# Patient Record
Sex: FEMALE | Employment: UNEMPLOYED | URBAN - METROPOLITAN AREA
[De-identification: names, ages, dates, MRNs, and addresses within clinical notes are randomized per-mention and may not be internally consistent; named-entity substitution may affect disease eponyms.]

---

## 2024-02-20 ENCOUNTER — OFFICE VISIT (OUTPATIENT)
Dept: PRIMARY CARE | Facility: CLINIC | Age: 49
End: 2024-02-20
Payer: COMMERCIAL

## 2024-02-20 VITALS — WEIGHT: 212 LBS | DIASTOLIC BLOOD PRESSURE: 80 MMHG | SYSTOLIC BLOOD PRESSURE: 140 MMHG

## 2024-02-20 DIAGNOSIS — R63.5 WEIGHT GAIN: ICD-10-CM

## 2024-02-20 DIAGNOSIS — R23.2 FLUSHING: ICD-10-CM

## 2024-02-20 DIAGNOSIS — R03.0 BLOOD PRESSURE ELEVATED WITHOUT HISTORY OF HTN: ICD-10-CM

## 2024-02-20 DIAGNOSIS — F41.8 DEPRESSION WITH ANXIETY: ICD-10-CM

## 2024-02-20 DIAGNOSIS — Z72.0 TOBACCO USE: ICD-10-CM

## 2024-02-20 DIAGNOSIS — Z00.00 HEALTH CARE MAINTENANCE: Primary | ICD-10-CM

## 2024-02-20 PROCEDURE — 99204 OFFICE O/P NEW MOD 45 MIN: CPT | Performed by: INTERNAL MEDICINE

## 2024-02-20 NOTE — PROGRESS NOTES
Subjective   Patient ID: Idania Simon is a 48 y.o. female who presents for No chief complaint on file..    HPI the patient for first time see updated front sheet no chest pain no shortness of breath was found increasing motion during the past 2 years menstrual cycles becoming irregular she had lost her longtime partner just before COVID was isolated and still taking care of adult children were doing well but also have some element of depression and anxiety over her life changes she has not preferred to talk to anybody about this or take medication for general health has been good she was drinking heavily and then stop drinking one half bottle of vodka per day and now only drinks rarely and socially still smokes on a regular basis just less than 1 pack/day takes no medications    Past medical history unremarkable per her    Medications none at the current time    Allergies no known drug allergies    Social history tobacco 1 pack/day alcohol social    Family history daughter with depression    Prevention hikes for exercise walks a dog no recent blood work has not had follow-up with GYN    Review of Systems    Objective   There were no vitals taken for this visit.    Physical Exam vital signs noted alert and oriented x 3 NCAT no lid lag no thyromegaly no JVD or bruit chest clear to auscultations regular rate and rhythm S1-S2 abdomen soft nontender normal active bowel sounds LS spine normal curvature negative straight leg raise extremities no clubbing cyanosis or edema normal distal pulses DTR 2+ and brisk    Assessment/Plan    impression hormonal changes with menstrual cycle changes tobacco weight gain used to weigh 185 pounds slower metabolism elevated blood pressure other diagnoses depression with anxiety  Plan stop smoking altogether stop drinking altogether increase water consumption diet weight loss exercise overall consider the psychology of talking with somebody or medication she will consider follow-up on  blood work comprehensive panel advised on glucose potassium kidney and liver function check CBC advise on blood count check lipid panel advise on thyroid and metabolism check TSH FSH and LH advised on symptoms and flushing which may be normal follow-up with new GYN referral to be made (check) and recheck based on above  [id number] [id number]

## 2024-02-27 ENCOUNTER — LAB (OUTPATIENT)
Dept: LAB | Facility: LAB | Age: 49
End: 2024-02-27
Payer: COMMERCIAL

## 2024-02-27 DIAGNOSIS — F41.8 DEPRESSION WITH ANXIETY: ICD-10-CM

## 2024-02-27 DIAGNOSIS — R63.5 WEIGHT GAIN: ICD-10-CM

## 2024-02-27 DIAGNOSIS — Z72.0 TOBACCO USE: ICD-10-CM

## 2024-02-27 DIAGNOSIS — Z00.00 HEALTH CARE MAINTENANCE: ICD-10-CM

## 2024-02-27 DIAGNOSIS — R23.2 FLUSHING: ICD-10-CM

## 2024-02-27 DIAGNOSIS — R03.0 BLOOD PRESSURE ELEVATED WITHOUT HISTORY OF HTN: ICD-10-CM

## 2024-02-27 LAB
ALBUMIN SERPL BCP-MCNC: 4.1 G/DL (ref 3.4–5)
ALP SERPL-CCNC: 47 U/L (ref 33–110)
ALT SERPL W P-5'-P-CCNC: 10 U/L (ref 7–45)
ANION GAP SERPL CALC-SCNC: 13 MMOL/L (ref 10–20)
AST SERPL W P-5'-P-CCNC: 11 U/L (ref 9–39)
BILIRUB SERPL-MCNC: 0.5 MG/DL (ref 0–1.2)
BUN SERPL-MCNC: 11 MG/DL (ref 6–23)
CALCIUM SERPL-MCNC: 9.1 MG/DL (ref 8.6–10.6)
CHLORIDE SERPL-SCNC: 105 MMOL/L (ref 98–107)
CHOLEST SERPL-MCNC: 190 MG/DL (ref 0–199)
CHOLESTEROL/HDL RATIO: 3.7
CO2 SERPL-SCNC: 25 MMOL/L (ref 21–32)
CREAT SERPL-MCNC: 0.77 MG/DL (ref 0.5–1.05)
EGFRCR SERPLBLD CKD-EPI 2021: >90 ML/MIN/1.73M*2
ERYTHROCYTE [DISTWIDTH] IN BLOOD BY AUTOMATED COUNT: 14.6 % (ref 11.5–14.5)
FSH SERPL-ACNC: 9.5 IU/L
GLUCOSE SERPL-MCNC: 101 MG/DL (ref 74–99)
HCT VFR BLD AUTO: 42.2 % (ref 36–46)
HDLC SERPL-MCNC: 50.7 MG/DL
HGB BLD-MCNC: 13.8 G/DL (ref 12–16)
LDLC SERPL CALC-MCNC: 116 MG/DL
LH SERPL-ACNC: 1.5 IU/L
MCH RBC QN AUTO: 31.5 PG (ref 26–34)
MCHC RBC AUTO-ENTMCNC: 32.7 G/DL (ref 32–36)
MCV RBC AUTO: 96 FL (ref 80–100)
NON HDL CHOLESTEROL: 139 MG/DL (ref 0–149)
NRBC BLD-RTO: 0 /100 WBCS (ref 0–0)
PLATELET # BLD AUTO: 302 X10*3/UL (ref 150–450)
POTASSIUM SERPL-SCNC: 4.2 MMOL/L (ref 3.5–5.3)
PROT SERPL-MCNC: 6.8 G/DL (ref 6.4–8.2)
RBC # BLD AUTO: 4.38 X10*6/UL (ref 4–5.2)
SODIUM SERPL-SCNC: 139 MMOL/L (ref 136–145)
TRIGL SERPL-MCNC: 119 MG/DL (ref 0–149)
TSH SERPL-ACNC: 1.24 MIU/L (ref 0.44–3.98)
VLDL: 24 MG/DL (ref 0–40)
WBC # BLD AUTO: 8.4 X10*3/UL (ref 4.4–11.3)

## 2024-02-27 PROCEDURE — 85027 COMPLETE CBC AUTOMATED: CPT

## 2024-02-27 PROCEDURE — 80053 COMPREHEN METABOLIC PANEL: CPT

## 2024-02-27 PROCEDURE — 36415 COLL VENOUS BLD VENIPUNCTURE: CPT

## 2024-02-27 PROCEDURE — 83002 ASSAY OF GONADOTROPIN (LH): CPT

## 2024-02-27 PROCEDURE — 83001 ASSAY OF GONADOTROPIN (FSH): CPT

## 2024-02-27 PROCEDURE — 84443 ASSAY THYROID STIM HORMONE: CPT

## 2024-02-27 PROCEDURE — 80061 LIPID PANEL: CPT

## 2024-04-02 ENCOUNTER — OFFICE VISIT (OUTPATIENT)
Dept: PRIMARY CARE | Facility: CLINIC | Age: 49
End: 2024-04-02
Payer: COMMERCIAL

## 2024-04-02 VITALS — DIASTOLIC BLOOD PRESSURE: 77 MMHG | SYSTOLIC BLOOD PRESSURE: 137 MMHG

## 2024-04-02 DIAGNOSIS — K11.8 MASS OF LEFT PAROTID GLAND: Primary | ICD-10-CM

## 2024-04-02 DIAGNOSIS — E78.5 BORDERLINE HYPERLIPIDEMIA: ICD-10-CM

## 2024-04-02 PROCEDURE — 99213 OFFICE O/P EST LOW 20 MIN: CPT | Performed by: INTERNAL MEDICINE

## 2024-04-02 NOTE — PROGRESS NOTES
Subjective   Patient ID: Idania Simon is a 48 y.o. female who presents for No chief complaint on file..    HPI sick visit same day after hours no staff no chest pain no shortness of breath no side effect with medication had mononucleosis earlier in life had some persistent left posterior chain lymph node swelling but approximately several weeks ago developed some swelling by the left side of her jaw she thought it was the same thing although it appears to be in a different location than what she was saying she has had some tingling across the face on that side and some otic discomfort    Social history tobacco 1 pack/day alcohol less    Review of Systems    Objective   There were no vitals taken for this visit.    Physical Exam vital signs noted alert and oriented x 3 NCAT cranial nerves II through XII intact no visible changes to the skin however there appears to be some left rami discomfort and swelling and firmness there tenderness to the pinna TM EAC clear bilaterally no AC nodes or PC nodes OP benign nares without discharge no coryza no JVD or bruit chest clear to auscultation CV regular rate and rhythm S1-S2 without murmur gallop or rub extremities no clubbing cyanosis or edema normal distal pulses    Assessment/Plan impression parotid enlargement tobacco other diagnoses/borderline lipid  Plan stop smoking altogether stop drinking altogether d/w prior lipids and benefit cardiovascular may need follow-up with ENT and in addition may use blood work and imaging to help identify nature of the swelling she does not feel her mouth is overly dry good diet regular exercise increase water consumption throat lozenges recheck based on above  Addendum  see prior blood work (esr?, none other prior) and imaging (none prior)  RAC

## 2024-04-16 ENCOUNTER — TELEPHONE (OUTPATIENT)
Dept: PRIMARY CARE | Facility: CLINIC | Age: 49
End: 2024-04-16

## 2024-04-17 DIAGNOSIS — K11.8 MASS OF LEFT PAROTID GLAND: Primary | ICD-10-CM

## 2024-04-17 DIAGNOSIS — E04.1 THYROID CYST: ICD-10-CM

## 2024-04-19 ENCOUNTER — HOSPITAL ENCOUNTER (OUTPATIENT)
Dept: RADIOLOGY | Facility: CLINIC | Age: 49
End: 2024-04-19
Payer: COMMERCIAL

## 2024-04-19 ENCOUNTER — HOSPITAL ENCOUNTER (OUTPATIENT)
Dept: RADIOLOGY | Facility: CLINIC | Age: 49
Discharge: HOME | End: 2024-04-19
Payer: COMMERCIAL

## 2024-04-19 DIAGNOSIS — K11.8 MASS OF LEFT PAROTID GLAND: ICD-10-CM

## 2024-04-19 PROCEDURE — 2550000001 HC RX 255 CONTRASTS: Performed by: INTERNAL MEDICINE

## 2024-04-19 PROCEDURE — 70491 CT SOFT TISSUE NECK W/DYE: CPT

## 2024-04-19 PROCEDURE — 70491 CT SOFT TISSUE NECK W/DYE: CPT | Performed by: RADIOLOGY

## 2024-04-19 RX ADMIN — IOHEXOL 67 ML: 350 INJECTION, SOLUTION INTRAVENOUS at 13:03

## 2024-06-06 ENCOUNTER — OFFICE VISIT (OUTPATIENT)
Dept: OTOLARYNGOLOGY | Facility: CLINIC | Age: 49
End: 2024-06-06
Payer: COMMERCIAL

## 2024-06-06 VITALS — WEIGHT: 187 LBS

## 2024-06-06 DIAGNOSIS — E04.1 THYROID NODULE: ICD-10-CM

## 2024-06-06 DIAGNOSIS — E04.1 THYROID CYST: ICD-10-CM

## 2024-06-06 DIAGNOSIS — K11.8 MASS OF LEFT PAROTID GLAND: ICD-10-CM

## 2024-06-06 PROCEDURE — 99204 OFFICE O/P NEW MOD 45 MIN: CPT | Performed by: GENERAL PRACTICE

## 2024-06-06 ASSESSMENT — PATIENT HEALTH QUESTIONNAIRE - PHQ9
SUM OF ALL RESPONSES TO PHQ9 QUESTIONS 1 AND 2: 0
2. FEELING DOWN, DEPRESSED OR HOPELESS: NOT AT ALL
1. LITTLE INTEREST OR PLEASURE IN DOING THINGS: NOT AT ALL

## 2024-06-08 ENCOUNTER — HOSPITAL ENCOUNTER (OUTPATIENT)
Dept: RADIOLOGY | Facility: HOSPITAL | Age: 49
Discharge: HOME | End: 2024-06-08
Payer: COMMERCIAL

## 2024-06-08 DIAGNOSIS — E04.1 THYROID NODULE: ICD-10-CM

## 2024-06-08 PROCEDURE — 76536 US EXAM OF HEAD AND NECK: CPT

## 2024-06-08 PROCEDURE — 76536 US EXAM OF HEAD AND NECK: CPT | Performed by: RADIOLOGY

## 2024-06-10 NOTE — PROGRESS NOTES
Otolaryngology - Head and Neck Surgery Outpatient New Patient Visit Note        Assessment/Plan   Problem List Items Addressed This Visit    None  Visit Diagnoses         Codes    Mass of left parotid gland     K11.8    Relevant Orders    Referral to ENT    BI US fine needle aspiration    Thyroid cyst     E04.1    Thyroid nodule     E04.1    Relevant Orders    Referral to ENT    US thyroid            48yoF with a left parotid mass which pt reports has been present since approx 2012 with episode of mono.  Some recent increase in size.  CT neck confirms 2.7cm heterogenous enhancing left parotid mass.   Will acquire FNA to aid in eval.  Suspect pleo or warthins (given smoking history).   Will refer to head and neck ENT for removal.     Also with R thyroid nodule, will acquire US to better define.        Follow up:  -plan for follow up in clinic as needed and after completion of ordered studies    All of the above findings, impressions, treatment planning and follow up plans were discussed with the patient who indicated understanding.  the patient was instructed to contact or return to clinic sooner if symptoms/signs persist or worsen despite the above management.      Roni Lynch MD  Otolaryngology - Head and Neck Surgery            History Of Present Illness  Idania Simon is a 48 y.o. female presenting for evluation of a left parotid mass.     Pt reports lonstanding history of L>R parotid tail region fullness.   Noted after mono infection in 2012.  R side largely resolved but noted some persistent left sided fullness.    Noted increase in size in recent 6 months, without inciting illness/trauma.   Notes some mild local discomfort, but no severe pain.  Somewhat firm and TTM.   No overlying skin changes  No symptoms of sialolithiasis    Has noted some left ear fullness and possible hearing loss.                  Past Medical History  She has no past medical history on file.    Surgical History  She has no past  surgical history on file.     Social History  She reports that she has been smoking cigarettes. She has never used smokeless tobacco. No history on file for alcohol use and drug use.    Family History  No family history on file.     Allergies  Patient has no known allergies.    Review of Systems  ROS: Pertinent positives as noted in HPI.    - CONSTITUTIONAL: Does not report weight loss, fever or chills.    - HEENT:   Ear: Does not report tinnitus, vertigo, hearing loss, otalgia, otorrhea  Nose: Does not report congestion, rhinorrhea, epistaxis, decreased smell  Throat: Does not report pain, dysphagia, odynophagia  Larynx: Does not report hoarseness,  difficulty breathing, pain with speaking (odynophonia)  Neck:    Face: Does not report sinus pain, pressure, swelling, numbness, weakness     - RESPIRATORY: Does not report SOB or cough.    - CV: Does not report palpitations or chest pain.     - GI: Does not report abdominal pain, nausea, vomiting or diarrhea.    - : Does not report dysuria or urinary frequency.    - MSK: Does not report myalgia or joint pain.    - SKIN: Does not report rash or pruritus.    - NEUROLOGICAL: Does not report headache or syncope.    - PSYCHIATRIC: Does not report recent changes in mood. Does not report anxiety or depression.         Physical Exam:     GENERAL:   Alert & Oriented to person, place and time; Normal affect and appearance. Well developed and well nourished. Conversant & cooperative with examination.     HEAD:   Normocephalic, atraumatic. No sinus tenderness to palpation. Normal parotid bilaterally. Normal facial strength.     NEUROLOGIC:   Cranial nerves II-XII grossly intact, gait WNL. Normal mood and affect.    EYES:   Extraocular movements intact. Pupils equal, round, reactive to light and accommodation. No nystagmus, no ptosis. no scleral injection.    EAR:   Normal auricle. No discomfort or TTP with manipulation.   Handheld otoscopic exam showed normal external auditory  canals bilaterally. No purulence or EAC inflammation. Minimal cerumen.   Right tympanic membrane clear and mobile without evidence of perforation, retraction or middle ear effusion.   Left tympanic membrane clear and mobile without evidence of perforation, retraction or middle ear effusion.     NOSE:   No external deformity. No external nasal lesions, lacerations, or scars. Nasal tip symmetrical with normal nasal valves.   Nasal cavity with essentially midline septum, normal mucosa and turbinates. No lesions, masses, purulence or polyps.     OC/OP:   Mucous membranes moist, no masses, lesions or exudates.   Normal tongue, floor of mouth, teeth, gums, lips. Normal posterior pharyngeal wall.    Normal tonsils without erythema, exudate or obvious calculi     NECK:  left tail of parotid with >2cm fullness which is somewhat fixed, minimally TTP, nonfluctuant.    Otherwise no neck masses or thyroid enlargement. Trachea midline. No tenderness to palpation    LYMPHATIC:   No cervical lymphadenopathy.     RESPIRATORY:   Symmetric chest elevation & no retractions. No significant hoarseness. No increased work of breathing.    CV:   No clubbing or cyanosis. No obvious edema    Skin:   No facial rashes, vesicles or lesions.     Extremities:   No gross abnormalities      Clinic Procedure        Information review:  External sources (notes, imaging, lab results) listed below personally reviewed to aid in medical decision making process.  - CT neck 4/19/24  -PCM note Gilbert 4/2/24  - PCM note Gilbert 2/20/24

## 2024-07-03 ENCOUNTER — HOSPITAL ENCOUNTER (OUTPATIENT)
Dept: RADIOLOGY | Facility: HOSPITAL | Age: 49
Discharge: HOME | End: 2024-07-03
Payer: COMMERCIAL

## 2024-07-03 VITALS
RESPIRATION RATE: 20 BRPM | OXYGEN SATURATION: 100 % | DIASTOLIC BLOOD PRESSURE: 92 MMHG | SYSTOLIC BLOOD PRESSURE: 161 MMHG | HEART RATE: 68 BPM

## 2024-07-03 DIAGNOSIS — K11.8 MASS OF LEFT PAROTID GLAND: ICD-10-CM

## 2024-07-03 PROCEDURE — 7100000010 HC PHASE TWO TIME - EACH INCREMENTAL 1 MINUTE

## 2024-07-03 PROCEDURE — 7100000009 HC PHASE TWO TIME - INITIAL BASE CHARGE

## 2024-07-03 PROCEDURE — 10005 FNA BX W/US GDN 1ST LES: CPT

## 2024-07-03 PROCEDURE — 2500000005 HC RX 250 GENERAL PHARMACY W/O HCPCS: Performed by: STUDENT IN AN ORGANIZED HEALTH CARE EDUCATION/TRAINING PROGRAM

## 2024-07-03 RX ORDER — LIDOCAINE HYDROCHLORIDE 10 MG/ML
INJECTION, SOLUTION EPIDURAL; INFILTRATION; INTRACAUDAL; PERINEURAL
Status: COMPLETED | OUTPATIENT
Start: 2024-07-03 | End: 2024-07-03

## 2024-07-03 ASSESSMENT — PAIN SCALES - GENERAL: PAINLEVEL_OUTOF10: 0 - NO PAIN

## 2024-07-03 NOTE — POST-PROCEDURE NOTE
Interventional Radiology Brief Postprocedure Note    Attending: aSi Hartman MD      Assistant: none    Diagnosis: left parotid gland mass    Description of procedure: US guided FNA     Anesthesia:  Local    Complications: None    Estimated Blood Loss: none    Medications (Filter: Administrations occurring from 0825 to 0845 on 07/03/24) As of 07/03/24 0845      lidocaine PF (Xylocaine) 10 mg/mL (1 %) injection (mL) Total volume:  5 mL      Date/Time Rate/Dose/Volume Action       07/03/24  0838 5 mL Given                   No specimens collected      See detailed result report with images in PACS.    The patient tolerated the procedure well without incident or complication and is in stable condition.

## 2024-07-08 LAB
LABORATORY COMMENT REPORT: NORMAL
LABORATORY COMMENT REPORT: NORMAL
PATH REPORT.FINAL DX SPEC: NORMAL
PATH REPORT.GROSS SPEC: NORMAL
PATH REPORT.RELEVANT HX SPEC: NORMAL
PATH REPORT.TOTAL CANCER: NORMAL

## 2024-07-09 NOTE — RESULT ENCOUNTER NOTE
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  Continue follow up with Head and Neck to discuss potential removal.

## 2024-07-12 ENCOUNTER — OFFICE VISIT (OUTPATIENT)
Dept: OTOLARYNGOLOGY | Facility: HOSPITAL | Age: 49
End: 2024-07-12
Payer: COMMERCIAL

## 2024-07-12 VITALS — TEMPERATURE: 98.2 F | BODY MASS INDEX: 29.49 KG/M2 | HEIGHT: 65 IN | WEIGHT: 177 LBS

## 2024-07-12 DIAGNOSIS — K11.8 MASS OF LEFT PAROTID GLAND: ICD-10-CM

## 2024-07-12 DIAGNOSIS — E04.1 THYROID NODULE: ICD-10-CM

## 2024-07-12 PROCEDURE — 99204 OFFICE O/P NEW MOD 45 MIN: CPT | Performed by: OTOLARYNGOLOGY

## 2024-07-12 PROCEDURE — 99214 OFFICE O/P EST MOD 30 MIN: CPT | Performed by: OTOLARYNGOLOGY

## 2024-07-12 PROCEDURE — 4004F PT TOBACCO SCREEN RCVD TLK: CPT | Performed by: OTOLARYNGOLOGY

## 2024-07-12 ASSESSMENT — PATIENT HEALTH QUESTIONNAIRE - PHQ9
1. LITTLE INTEREST OR PLEASURE IN DOING THINGS: NOT AT ALL
2. FEELING DOWN, DEPRESSED OR HOPELESS: NOT AT ALL
SUM OF ALL RESPONSES TO PHQ9 QUESTIONS 1 & 2: 0

## 2024-07-12 NOTE — PROGRESS NOTES
"ENT New Patient Visit    Chief complaint: left Warthin's tumor     History Of Present Illness  Idania Simon is a 48 y.o. female presents with left parotid mass present since 2012 but has progressively increased in size over the last 6 months. She was seen by Dr. Lynch who obtained a CT that demonstrated the 2.7cm left parotid mass, and subsequently biopsied, which showed Warthin's. She has a 30 pack year smoking history.     She has been experiencing intermittent pain and would like the mass removed.      Past Medical History  She has no past medical history on file.    Surgical History  She has no past surgical history on file.     Social History  She reports that she has been smoking cigarettes. She has never used smokeless tobacco. No history on file for alcohol use and drug use.    Family History  No family history on file.     Allergies  Patient has no known allergies.     Physical Exam:  CONSTITUTIONAL:  No acute distress  VOICE:  No hoarseness or other abnormality  RESPIRATION:  Breathing comfortably, no stridor  CV:  No clubbing/cyanosis/edema in hands  EYES:  sclera normal  NEURO:  Alert and oriented times 3, CN VII intact and symmetric bilaterally  HEAD AND FACE:  Symmetric facial features  SALIVARY GLANDS:  approximately 3 cm firm mass present in tail of left parotid, relatively mobile, somewhat tender to palpation  EARS:  Normal external ears, external auditory canals, and TMs to otoscopy, normal hearing to whispered voice.  NOSE:  External nose midline  ORAL CAVITY/OROPHARYNX/LIPS:  Normal mucous membranes, normal floor of mouth/tongue/OP, no masses or lesions  PHARYNGEAL WALLS:  No masses or lesions  NECK/LYMPH:  No LAD, trachea midline  SKIN:  Neck skin is without scar or injury  PSYCH:  Alert and oriented with appropriate mood and affect    Last Recorded Vitals  Temperature 36.8 °C (98.2 °F), height 1.651 m (5' 5\"), weight 80.3 kg (177 lb).    Relevant Results      No results found for this or " any previous visit (from the past 24 hour(s)).  CT Soft tissue neck 4/19/24:  IMPRESSION:  2.7 cm diameter heterogeneous enhancing mass predominantly located  within the superficial aspect of the left parotid gland with mild  extension to the deep lobe. Findings likely represent a primary  salivary gland neoplasm such as a Warthin's or benign mixed tumor.  Recommend FNA for further evaluation.      No enlarged or overtly suspicious cervical chain lymph nodes.      Subcentimeter right thyroid gland nodule.          Assessment and Plan  48 y.o. female with left parotid mass since 2012 that increased in size over the last 6 months, biopsy showed Warthin's tumor. She would like this removed. We discussed indications, risks  and benefits of left parotidectomy. We will schedule her at [place].     Bobbi Moody MD

## 2024-07-22 ENCOUNTER — PRE-ADMISSION TESTING (OUTPATIENT)
Dept: PREADMISSION TESTING | Facility: HOSPITAL | Age: 49
End: 2024-07-22
Payer: COMMERCIAL

## 2024-07-22 VITALS
WEIGHT: 178.57 LBS | OXYGEN SATURATION: 100 % | SYSTOLIC BLOOD PRESSURE: 143 MMHG | HEIGHT: 65 IN | BODY MASS INDEX: 29.75 KG/M2 | RESPIRATION RATE: 18 BRPM | DIASTOLIC BLOOD PRESSURE: 93 MMHG | TEMPERATURE: 95.9 F | HEART RATE: 72 BPM

## 2024-07-22 DIAGNOSIS — Z01.818 PREOP TESTING: Primary | ICD-10-CM

## 2024-07-22 LAB
ANION GAP SERPL CALC-SCNC: 11 MMOL/L (ref 10–20)
BUN SERPL-MCNC: 14 MG/DL (ref 6–23)
CALCIUM SERPL-MCNC: 8.8 MG/DL (ref 8.6–10.3)
CHLORIDE SERPL-SCNC: 105 MMOL/L (ref 98–107)
CO2 SERPL-SCNC: 26 MMOL/L (ref 21–32)
CREAT SERPL-MCNC: 0.76 MG/DL (ref 0.5–1.05)
EGFRCR SERPLBLD CKD-EPI 2021: >90 ML/MIN/1.73M*2
ERYTHROCYTE [DISTWIDTH] IN BLOOD BY AUTOMATED COUNT: 13.5 % (ref 11.5–14.5)
GLUCOSE SERPL-MCNC: 102 MG/DL (ref 74–99)
HCT VFR BLD AUTO: 40.2 % (ref 36–46)
HGB BLD-MCNC: 13.5 G/DL (ref 12–16)
MCH RBC QN AUTO: 32.2 PG (ref 26–34)
MCHC RBC AUTO-ENTMCNC: 33.6 G/DL (ref 32–36)
MCV RBC AUTO: 96 FL (ref 80–100)
NRBC BLD-RTO: 0 /100 WBCS (ref 0–0)
PLATELET # BLD AUTO: 240 X10*3/UL (ref 150–450)
POTASSIUM SERPL-SCNC: 4 MMOL/L (ref 3.5–5.3)
RBC # BLD AUTO: 4.19 X10*6/UL (ref 4–5.2)
SODIUM SERPL-SCNC: 138 MMOL/L (ref 136–145)
WBC # BLD AUTO: 7.3 X10*3/UL (ref 4.4–11.3)

## 2024-07-22 PROCEDURE — 99203 OFFICE O/P NEW LOW 30 MIN: CPT | Performed by: NURSE PRACTITIONER

## 2024-07-22 PROCEDURE — 93010 ELECTROCARDIOGRAM REPORT: CPT | Performed by: INTERNAL MEDICINE

## 2024-07-22 PROCEDURE — 80048 BASIC METABOLIC PNL TOTAL CA: CPT

## 2024-07-22 PROCEDURE — 93005 ELECTROCARDIOGRAM TRACING: CPT

## 2024-07-22 PROCEDURE — 85027 COMPLETE CBC AUTOMATED: CPT

## 2024-07-22 PROCEDURE — 36415 COLL VENOUS BLD VENIPUNCTURE: CPT

## 2024-07-22 ASSESSMENT — DUKE ACTIVITY SCORE INDEX (DASI)
CAN YOU HAVE SEXUAL RELATIONS: YES
CAN YOU TAKE CARE OF YOURSELF (EAT, DRESS, BATHE, OR USE TOILET): YES
CAN YOU DO MODERATE WORK AROUND THE HOUSE LIKE VACUUMING, SWEEPING FLOORS OR CARRYING GROCERIES: YES
CAN YOU CLIMB A FLIGHT OF STAIRS OR WALK UP A HILL: YES
CAN YOU WALK A BLOCK OR TWO ON LEVEL GROUND: YES
CAN YOU PARTICIPATE IN STRENOUS SPORTS LIKE SWIMMING, SINGLES TENNIS, FOOTBALL, BASKETBALL, OR SKIING: NO
CAN YOU PARTICIPATE IN MODERATE RECREATIONAL ACTIVITIES LIKE GOLF, BOWLING, DANCING, DOUBLES TENNIS OR THROWING A BASEBALL OR FOOTBALL: NO
CAN YOU WALK INDOORS, SUCH AS AROUND YOUR HOUSE: YES
CAN YOU DO YARD WORK LIKE RAKING LEAVES, WEEDING OR PUSHING A MOWER: YES
CAN YOU DO LIGHT WORK AROUND THE HOUSE LIKE DUSTING OR WASHING DISHES: YES
CAN YOU DO HEAVY WORK AROUND THE HOUSE LIKE SCRUBBING FLOORS OR LIFTING AND MOVING HEAVY FURNITURE: YES

## 2024-07-22 ASSESSMENT — PAIN SCALES - GENERAL: PAINLEVEL_OUTOF10: 0 - NO PAIN

## 2024-07-22 NOTE — PREPROCEDURE INSTRUCTIONS
Medication List      as of July 22, 2024  2:07 PM     You have not been prescribed any medications.                         NPO Instructions:    Do not eat any food after midnight the night before your surgery/procedure.    Additional Instructions:     Day of Surgery:  You may chew gum up to TWO hours before your surgery/procedure  Wear  comfortable loose fitting clothing  Do not use moisturizers, creams, lotions or perfume  All jewelry and valuables should be left at home      PRE-OPERATIVE INSTRUCTIONS FOR SURGERY    *Do not eat anything after midnight the night of surgery.  This includes food of any kind (including hard candy, cough drops, mints).   You may have up to 13.5 ounces of clear liquid  until TWO hours prior to your arrival time to the hospital.  This includes water, black tea/coffee, (no milk or cream) apple juice and electrolyte drinks (GATORADE).  You may chew gum until TWO hours prior you your surgery/procedure.         *One of our staff members will call you ONE business day before your surgery, between 11am-2 pm to let you know the time to arrive.  If you have not received a call by 2 pm, call [phone]  *When you arrive at the hospital-->GO TO Registration on the ground floor  *Stop smoking 24 hours prior to surgery.  No Marijuana, CBD Oil or Vaping for 48 hours  *No alcohol 24 hours prior to surgery  *You will need a responsible adult to drive you home  -No acrylic nails or nail polish on at least one fingernail, NO polish on toes for foot surgery  -You may be asked to remove your dentures, partial plate, eyeglasses or contact lenses before going to surgery.  Please bring a case for these items.  -Body piercings need to be removed.  Jewelry and valuables should be left at home.  -Put on loose,  comfortable, clean clothing, that will accommodate bandages      What you may be asked to bring to surgery:  ___Urine specimen       Thank You for allowing us to assist you towards a successful  jose l...Dana ELIZABETH

## 2024-07-22 NOTE — H&P (VIEW-ONLY)
CPM/PAT Evaluation       Name: Idania Simon (Idania Simon)  /Age: 1975/48 y.o.     In-Person       Chief Complaint: Left parotid mass    48 yr old female with c/o Left parotid mass.  Reports years long hx of Left mass which was only watched until recently beginning about 6 months ago when mass began to change.  States mass has increased in size, was lightly mobil before but now has become more stable and hardened.  Additionally c/o mass tenderness and discomfort with neck activity and position changes.  Reports can swallow and consume food although has experienced 20 lbs of unintentional weight loss over the past few months. States has biopsy a couple weeks ago and has since noticed swelling and hardening of mass.  No current prescribed medications. Reports remaining otherwise physically active, denies cardiac or respiratory symptoms.  No previous adult anesthesia.     Followed by PCP (Gilbert) - last visit 2024    Pertinent hx - seen by Dr. Lynch who obtained a CT that demonstrated the 2.7cm left parotid mass, and subsequently biopsied, which showed Warthin's. She has a 30 pack year smoking history.         History reviewed. No pertinent past medical history.    Past Surgical History:   Procedure Laterality Date    ADENOIDECTOMY      TONSILLECTOMY         Patient Sexual activity questions deferred to the physician.    No family history on file.    No Known Allergies    Prior to Admission medications    Not on File        Review of Systems    Constitutional: no fever, no chills and not feeling poorly.   Eyes: no eyesight problems.   ENT: see HPI, no hearing loss, no nosebleeds and no sore throat.   Cardiovascular: no chest pain, no palpitations and no extremity edema.   Respiratory: no shortness of breath, no wheezing, no cough and no sob w/exertion.   Gastrointestinal: negative for abdominal pain, blood in stools or changes in bowel habits   Genitourinary: negative for dysuria, incontinence or  changes in urinary habits   Musculoskeletal: no arthralgias and no gait abnormality.   Integumentary: negative for lesions, rash or itching.   Neurological: negative for confusion, dizziness or fainting.   Psychiatric: not suicidal, no anxiety and no depression.   All other systems have been reviewed and are negative for complaint.     Physical Exam  Constitutional:       General: No acute distress.     Aox3, pleasant and cooperative, appropriate mood and eye contact   HENT:      Head: Normocephalic.      Mouth/Throat: Mucous membranes moist & pink; mass to Left upper neck by ear - skin intact  Eyes:      Vision grossly intact, PERRLA   Neck:      No carotid bruit, no JVD  Cardiovascular:      RRR, S1S2, no murmurs, rubs or gallops  Pulmonary:      Symmetric chest expansion, CTA, Room Air  Abdominal:      Soft non-tender, BSx4   Skin:     Warm, dry & intact   Extremities:      No gross deformities; normal gait   Neurological:      No focal deficit, Aox3, NAVARRETE x4  Psychiatric:      Pleasant & cooperative, appropriate affect    PAT AIRWAY:   Airway:     Mallampati::  II    TM distance::  >3 FB    Neck ROM::  Full  normal        Visit Vitals  BP (!) 143/93   Pulse 72   Temp 35.5 °C (95.9 °F) (Tympanic)   Resp 18       DASI Risk Score    No data to display       Caprini DVT Assessment    No data to display       Modified Frailty Index    No data to display       CHADS2 Stroke Risk  Current as of 16 minutes ago        N/A 3 to 100%: High Risk   2 to < 3%: Medium Risk   0 to < 2%: Low Risk     Last Change: N/A          This score determines the patient's risk of having a stroke if the patient has atrial fibrillation.        This score is not applicable to this patient. Components are not calculated.          Revised Cardiac Risk Index    No data to display       Apfel Simplified Score    No data to display       Risk Analysis Index Results This Encounter    No data found in the last 1 encounters.       Stop Bang Score       Flowsheet Row Most Recent Value   Do you snore loudly? 0   Do you often feel tired or fatigued after your sleep? 0   Has anyone ever observed you stop breathing in your sleep? 0   Do you have or are you being treated for high blood pressure? 0   Recent BMI (Calculated) 29.7   Is BMI greater than 35 kg/m2? 0=No   Age older than 50 years old? 0=No   Is your neck circumference greater than 17 inches (Male) or 16 inches (Female)? 0   Gender - Male 0=No   STOP-BANG Total Score 0            Assessment and Plan:     Followed by otolaryngology, Left parotid gland mass    Left parotidectomy w/Dr Manuel on 8/5/2024    Ecg performed today - NSR (61 bpm)

## 2024-07-27 LAB
ATRIAL RATE: 61 BPM
P AXIS: 32 DEGREES
P OFFSET: 197 MS
P ONSET: 153 MS
PR INTERVAL: 134 MS
Q ONSET: 220 MS
QRS COUNT: 10 BEATS
QRS DURATION: 86 MS
QT INTERVAL: 406 MS
QTC CALCULATION(BAZETT): 408 MS
QTC FREDERICIA: 408 MS
R AXIS: 15 DEGREES
T AXIS: 21 DEGREES
T OFFSET: 423 MS
VENTRICULAR RATE: 61 BPM

## 2024-08-05 ENCOUNTER — ANESTHESIA EVENT (OUTPATIENT)
Dept: OPERATING ROOM | Facility: HOSPITAL | Age: 49
End: 2024-08-05
Payer: COMMERCIAL

## 2024-08-05 ENCOUNTER — ANESTHESIA (OUTPATIENT)
Dept: OPERATING ROOM | Facility: HOSPITAL | Age: 49
End: 2024-08-05
Payer: COMMERCIAL

## 2024-08-05 ENCOUNTER — HOSPITAL ENCOUNTER (OUTPATIENT)
Facility: HOSPITAL | Age: 49
Setting detail: OUTPATIENT SURGERY
Discharge: HOME | End: 2024-08-05
Attending: OTOLARYNGOLOGY | Admitting: OTOLARYNGOLOGY
Payer: COMMERCIAL

## 2024-08-05 VITALS
TEMPERATURE: 97.2 F | BODY MASS INDEX: 29.75 KG/M2 | WEIGHT: 178.57 LBS | HEART RATE: 70 BPM | OXYGEN SATURATION: 99 % | HEIGHT: 65 IN | SYSTOLIC BLOOD PRESSURE: 146 MMHG | RESPIRATION RATE: 16 BRPM | DIASTOLIC BLOOD PRESSURE: 72 MMHG

## 2024-08-05 DIAGNOSIS — K11.8 MASS OF LEFT PAROTID GLAND: ICD-10-CM

## 2024-08-05 PROCEDURE — 3600000009 HC OR TIME - EACH INCREMENTAL 1 MINUTE - PROCEDURE LEVEL FOUR: Performed by: OTOLARYNGOLOGY

## 2024-08-05 PROCEDURE — 42410 EXCISE PAROTID GLAND/LESION: CPT | Performed by: OTOLARYNGOLOGY

## 2024-08-05 PROCEDURE — A42410 PR EXC PAROTD LESN,LATER LOBE: Performed by: ANESTHESIOLOGIST ASSISTANT

## 2024-08-05 PROCEDURE — 7100000001 HC RECOVERY ROOM TIME - INITIAL BASE CHARGE: Performed by: OTOLARYNGOLOGY

## 2024-08-05 PROCEDURE — 2500000004 HC RX 250 GENERAL PHARMACY W/ HCPCS (ALT 636 FOR OP/ED): Performed by: ANESTHESIOLOGY

## 2024-08-05 PROCEDURE — 2500000005 HC RX 250 GENERAL PHARMACY W/O HCPCS: Performed by: OTOLARYNGOLOGY

## 2024-08-05 PROCEDURE — 88307 TISSUE EXAM BY PATHOLOGIST: CPT | Mod: TC,PARLAB | Performed by: OTOLARYNGOLOGY

## 2024-08-05 PROCEDURE — 2720000007 HC OR 272 NO HCPCS: Performed by: OTOLARYNGOLOGY

## 2024-08-05 PROCEDURE — 7100000010 HC PHASE TWO TIME - EACH INCREMENTAL 1 MINUTE: Performed by: OTOLARYNGOLOGY

## 2024-08-05 PROCEDURE — 2500000005 HC RX 250 GENERAL PHARMACY W/O HCPCS: Performed by: ANESTHESIOLOGIST ASSISTANT

## 2024-08-05 PROCEDURE — 2500000004 HC RX 250 GENERAL PHARMACY W/ HCPCS (ALT 636 FOR OP/ED): Mod: JZ | Performed by: OTOLARYNGOLOGY

## 2024-08-05 PROCEDURE — 2500000004 HC RX 250 GENERAL PHARMACY W/ HCPCS (ALT 636 FOR OP/ED): Performed by: OTOLARYNGOLOGY

## 2024-08-05 PROCEDURE — 3600000004 HC OR TIME - INITIAL BASE CHARGE - PROCEDURE LEVEL FOUR: Performed by: OTOLARYNGOLOGY

## 2024-08-05 PROCEDURE — 3700000002 HC GENERAL ANESTHESIA TIME - EACH INCREMENTAL 1 MINUTE: Performed by: OTOLARYNGOLOGY

## 2024-08-05 PROCEDURE — 7100000009 HC PHASE TWO TIME - INITIAL BASE CHARGE: Performed by: OTOLARYNGOLOGY

## 2024-08-05 PROCEDURE — 3700000001 HC GENERAL ANESTHESIA TIME - INITIAL BASE CHARGE: Performed by: OTOLARYNGOLOGY

## 2024-08-05 PROCEDURE — A42410 PR EXC PAROTD LESN,LATER LOBE: Performed by: ANESTHESIOLOGY

## 2024-08-05 PROCEDURE — 7100000002 HC RECOVERY ROOM TIME - EACH INCREMENTAL 1 MINUTE: Performed by: OTOLARYNGOLOGY

## 2024-08-05 PROCEDURE — 2500000004 HC RX 250 GENERAL PHARMACY W/ HCPCS (ALT 636 FOR OP/ED): Performed by: ANESTHESIOLOGIST ASSISTANT

## 2024-08-05 RX ORDER — SUCCINYLCHOLINE CHLORIDE 20 MG/ML INJECTION SOLUTION
SOLUTION AS NEEDED
Status: DISCONTINUED | OUTPATIENT
Start: 2024-08-05 | End: 2024-08-05

## 2024-08-05 RX ORDER — MEPERIDINE HYDROCHLORIDE 25 MG/ML
12.5 INJECTION INTRAMUSCULAR; INTRAVENOUS; SUBCUTANEOUS EVERY 10 MIN PRN
Status: DISCONTINUED | OUTPATIENT
Start: 2024-08-05 | End: 2024-08-05 | Stop reason: HOSPADM

## 2024-08-05 RX ORDER — SODIUM CHLORIDE, SODIUM LACTATE, POTASSIUM CHLORIDE, CALCIUM CHLORIDE 600; 310; 30; 20 MG/100ML; MG/100ML; MG/100ML; MG/100ML
75 INJECTION, SOLUTION INTRAVENOUS CONTINUOUS
Status: DISCONTINUED | OUTPATIENT
Start: 2024-08-05 | End: 2024-08-05 | Stop reason: HOSPADM

## 2024-08-05 RX ORDER — CEFAZOLIN SODIUM 2 G/100ML
2 INJECTION, SOLUTION INTRAVENOUS EVERY 8 HOURS
Status: DISCONTINUED | OUTPATIENT
Start: 2024-08-05 | End: 2024-08-05 | Stop reason: HOSPADM

## 2024-08-05 RX ORDER — DIPHENHYDRAMINE HYDROCHLORIDE 50 MG/ML
12.5 INJECTION INTRAMUSCULAR; INTRAVENOUS ONCE AS NEEDED
Status: DISCONTINUED | OUTPATIENT
Start: 2024-08-05 | End: 2024-08-05 | Stop reason: HOSPADM

## 2024-08-05 RX ORDER — ONDANSETRON HYDROCHLORIDE 2 MG/ML
INJECTION, SOLUTION INTRAVENOUS AS NEEDED
Status: DISCONTINUED | OUTPATIENT
Start: 2024-08-05 | End: 2024-08-05

## 2024-08-05 RX ORDER — HYDRALAZINE HYDROCHLORIDE 20 MG/ML
5 INJECTION INTRAMUSCULAR; INTRAVENOUS EVERY 30 MIN PRN
Status: DISCONTINUED | OUTPATIENT
Start: 2024-08-05 | End: 2024-08-05 | Stop reason: HOSPADM

## 2024-08-05 RX ORDER — HYDROMORPHONE HYDROCHLORIDE 1 MG/ML
1 INJECTION, SOLUTION INTRAMUSCULAR; INTRAVENOUS; SUBCUTANEOUS EVERY 5 MIN PRN
Status: DISCONTINUED | OUTPATIENT
Start: 2024-08-05 | End: 2024-08-05 | Stop reason: HOSPADM

## 2024-08-05 RX ORDER — ACETAMINOPHEN 325 MG/1
650 TABLET ORAL EVERY 4 HOURS PRN
Status: DISCONTINUED | OUTPATIENT
Start: 2024-08-05 | End: 2024-08-05 | Stop reason: HOSPADM

## 2024-08-05 RX ORDER — LIDOCAINE HYDROCHLORIDE AND EPINEPHRINE 10; 10 MG/ML; UG/ML
INJECTION, SOLUTION INFILTRATION; PERINEURAL AS NEEDED
Status: DISCONTINUED | OUTPATIENT
Start: 2024-08-05 | End: 2024-08-05 | Stop reason: HOSPADM

## 2024-08-05 RX ORDER — PROPOFOL 10 MG/ML
INJECTION, EMULSION INTRAVENOUS AS NEEDED
Status: DISCONTINUED | OUTPATIENT
Start: 2024-08-05 | End: 2024-08-05

## 2024-08-05 RX ORDER — SODIUM CHLORIDE, SODIUM LACTATE, POTASSIUM CHLORIDE, CALCIUM CHLORIDE 600; 310; 30; 20 MG/100ML; MG/100ML; MG/100ML; MG/100ML
100 INJECTION, SOLUTION INTRAVENOUS CONTINUOUS
Status: DISCONTINUED | OUTPATIENT
Start: 2024-08-05 | End: 2024-08-05 | Stop reason: HOSPADM

## 2024-08-05 RX ORDER — FENTANYL CITRATE 50 UG/ML
INJECTION, SOLUTION INTRAMUSCULAR; INTRAVENOUS AS NEEDED
Status: DISCONTINUED | OUTPATIENT
Start: 2024-08-05 | End: 2024-08-05

## 2024-08-05 RX ORDER — LABETALOL HYDROCHLORIDE 5 MG/ML
5 INJECTION, SOLUTION INTRAVENOUS ONCE AS NEEDED
Status: DISCONTINUED | OUTPATIENT
Start: 2024-08-05 | End: 2024-08-05 | Stop reason: HOSPADM

## 2024-08-05 RX ORDER — ONDANSETRON HYDROCHLORIDE 2 MG/ML
4 INJECTION, SOLUTION INTRAVENOUS ONCE AS NEEDED
Status: DISCONTINUED | OUTPATIENT
Start: 2024-08-05 | End: 2024-08-05 | Stop reason: HOSPADM

## 2024-08-05 RX ORDER — LIDOCAINE HCL/PF 100 MG/5ML
SYRINGE (ML) INTRAVENOUS AS NEEDED
Status: DISCONTINUED | OUTPATIENT
Start: 2024-08-05 | End: 2024-08-05

## 2024-08-05 RX ORDER — MIDAZOLAM HYDROCHLORIDE 1 MG/ML
INJECTION, SOLUTION INTRAMUSCULAR; INTRAVENOUS AS NEEDED
Status: DISCONTINUED | OUTPATIENT
Start: 2024-08-05 | End: 2024-08-05

## 2024-08-05 RX ORDER — SODIUM CHLORIDE 0.9 G/100ML
IRRIGANT IRRIGATION AS NEEDED
Status: DISCONTINUED | OUTPATIENT
Start: 2024-08-05 | End: 2024-08-05 | Stop reason: HOSPADM

## 2024-08-05 SDOH — HEALTH STABILITY: MENTAL HEALTH: CURRENT SMOKER: 1

## 2024-08-05 ASSESSMENT — COLUMBIA-SUICIDE SEVERITY RATING SCALE - C-SSRS
6. HAVE YOU EVER DONE ANYTHING, STARTED TO DO ANYTHING, OR PREPARED TO DO ANYTHING TO END YOUR LIFE?: NO
2. HAVE YOU ACTUALLY HAD ANY THOUGHTS OF KILLING YOURSELF?: NO

## 2024-08-05 ASSESSMENT — PAIN DESCRIPTION - DESCRIPTORS: DESCRIPTORS: ACHING

## 2024-08-05 ASSESSMENT — PAIN - FUNCTIONAL ASSESSMENT
PAIN_FUNCTIONAL_ASSESSMENT: 0-10

## 2024-08-05 ASSESSMENT — PAIN SCALES - GENERAL
PAINLEVEL_OUTOF10: 0 - NO PAIN
PAINLEVEL_OUTOF10: 2
PAINLEVEL_OUTOF10: 0 - NO PAIN
PAIN_LEVEL: 0

## 2024-08-05 NOTE — ANESTHESIA PROCEDURE NOTES
Airway  Date/Time: 8/5/2024 7:38 AM  Urgency: elective    Airway not difficult    Staffing  Performed: [facility]   Authorized by: Ryan Preston MD    Performed by: PRADIP Avina  Patient location during procedure: OR    Indications and Patient Condition  Indications for airway management: anesthesia and airway protection  Spontaneous Ventilation: absent  Sedation level: deep  Preoxygenated: yes  Patient position: sniffing  MILS maintained throughout  Mask difficulty assessment: 1 - vent by mask  Planned trial extubation    Final Airway Details  Final airway type: endotracheal airway      Successful airway: ETT  Cuffed: yes   Successful intubation technique: video laryngoscopy  Facilitating devices/methods: intubating stylet  Endotracheal tube insertion site: oral  Blade: Moe  Blade size: #3  ETT size (mm): 7.0  Cormack-Lehane Classification: grade I - full view of glottis  Placement verified by: capnometry   Cuff volume (mL): 8  Measured from: lips  ETT to lips (cm): 22  Number of attempts at approach: 1  Number of other approaches attempted: 0    Additional Comments  Easy BMV, Easy intubation by [facility]-2 with elective Estrada MAC 3 Lips and teeth in preanesthetic condition.

## 2024-08-05 NOTE — OP NOTE
OPERATIVE NOTE     Date:  2024 OR Location: [facility]    Name: Idania Simon : 1975, Age: 48 y.o., MRN: [id number], Sex: female      Surgeons   Romie Manuel MD    Resident/Fellow/Other Assistant:  Ne Steele MD    Anesthesia: General  ASA: II  Anesthesia Staff: Anesthesiologist: Ryan Preston MD  C-AA: PRADIP Avina  SUMMER: Tomi Saenz  Staff: Circulator: Charu Nazario RN  Relief Circulator: Hansa Mayorga RN  Scrub Person: Janae Pascual      Preoperative Diagnosis:  Left parotid mass    Postoperative Diagnosis:  Same    Procedure:  Left superficial parotidectomy without facial nerve dissection    Findings:  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 Facial nerve not encountered.    Estimated Blood Loss:  None    Implantables/Drains:  none    Complications:  None    Description of Procedure:  This patient is a 47 yo woman with a left parotid mass, FNA consistent with Warthin's tumor. It has been growing and she wanted it removed.     The patient was met in the preoperative area and informed consent was confirmed after discussing the risks, benefits, and alternatives of the procedure. The patient was then brought to the operating room and transferred to the table. A preoperative huddle was performed, confirming the correct patient, procedure, laterality, and allergies. The patient was induced under general anesthesia and turned towards the surgical team.     he facial nerve monitor was connected and checked for accuracy.  Preoperative antibiotics were administered and documented.  No long-term muscle relaxants were used.  A modified Emilio incision was planned to overlie the face and marked with a marker.  This was then injected with 1:100,000 lidocaine with epinephrine.  Following this, the patient was prepped and draped in the usual fashion.  A 15-blade scalpel was used to incise the skin.  Skin hooks were used for retraction.  Bovie  electrocautery was used and the incision taken down to the level of the SMAS overlying the parotid tissue.  This was done first in the preauricular area and then we extended our incision down into the neck.  The great auricular nerve was identified and we preserved its posterior aspect.  The anterior branch was sacrificed as it was leading towards the parotid mass. We then dissected through the fascia to find the tumor capsule. We dissected bluntly around the tumor capsule carefully in all directions. Some typical Warthin's fluid was spilled at a thin area but otherwise the tumor was freed and removed. The fascia was still intact over the deeper parotid, we did not see any facial nerve branches.    Hemostasis and irrigation was performed. Some surgicel snow was placed into the wound. The parotid fascia was re-approximated with a 3-0 vicryl. The incision was closed in layers using 3-0 vicryl and 4-0 monocryl. Steri strips were placed.    I was present for the critical portions of the procedure.

## 2024-08-05 NOTE — ANESTHESIA PREPROCEDURE EVALUATION
Patient: Idania Simon    Procedure Information       Date/Time: 08/05/24 0730    Procedure: LEFT PAROTIDECTOMY (Left)    Location: [facility] 05 / [facility]    Surgeons: Romie Manuel MD            Relevant Problems   Anesthesia (within normal limits)       Clinical information reviewed:   Tobacco  Allergies  Meds   Med Hx  Surg Hx  OB Status  Fam Hx  Soc   Hx        NPO Detail:  NPO/Void Status  Carbohydrate Drink Given Prior to Surgery? : N  Date of Last Liquid: 08/05/24  Time of Last Liquid: 0402  Date of Last Solid: 08/04/24  Time of Last Solid: 2000  Last Intake Type: Solid meal  Time of Last Void: 0629         Physical Exam    Airway  Mallampati: II  TM distance: >3 FB  Neck ROM: full     Cardiovascular - normal exam     Dental - normal exam     Pulmonary - normal exam     Abdominal            Anesthesia Plan    History of general anesthesia?: yes  History of complications of general anesthesia?: no    ASA 2     general     The patient is a current smoker.  Patient was not previously instructed to abstain from smoking on day of procedure.  Patient smoked on day of procedure.    intravenous induction   Postoperative administration of opioids is intended.  Trial extubation is planned.  Anesthetic plan and risks discussed with patient.  Use of blood products discussed with patient who consented to blood products.    Plan discussed with CAA.

## 2024-08-05 NOTE — ANESTHESIA POSTPROCEDURE EVALUATION
Patient: Idania Simon    Procedure Summary       Date: 08/05/24 Room / Location: [facility]  / [facility]    Anesthesia Start: 0729 Anesthesia Stop: 0907    Procedure: LEFT PAROTIDECTOMY (Left: Neck) Diagnosis:       Mass of left parotid gland      (Mass of left parotid gland [K11.8])    Surgeons: Romie Manuel MD Responsible Provider: Ryan Preston MD    Anesthesia Type: general ASA Status: 2            Anesthesia Type: general    Vitals Value Taken Time   /75 08/05/24 0906   Temp 36.2 °C (97.2 °F) 08/05/24 0906   Pulse 87 08/05/24 0906   Resp 16 08/05/24 0906   SpO2 100 % 08/05/24 0906       Anesthesia Post Evaluation    Patient location during evaluation: PACU  Patient participation: complete - patient participated  Level of consciousness: awake  Pain score: 0  Pain management: adequate  Airway patency: patent  Cardiovascular status: acceptable  Respiratory status: acceptable  Hydration status: acceptable  Postoperative Nausea and Vomiting: none        There were no known notable events for this encounter.

## 2024-08-06 ASSESSMENT — PAIN SCALES - GENERAL: PAINLEVEL_OUTOF10: 4

## 2024-08-08 LAB
LABORATORY COMMENT REPORT: NORMAL
PATH REPORT.FINAL DX SPEC: NORMAL
PATH REPORT.GROSS SPEC: NORMAL
PATH REPORT.RELEVANT HX SPEC: NORMAL
PATH REPORT.TOTAL CANCER: NORMAL

## 2024-08-19 ENCOUNTER — APPOINTMENT (OUTPATIENT)
Dept: OTOLARYNGOLOGY | Facility: CLINIC | Age: 49
End: 2024-08-19
Payer: COMMERCIAL

## 2024-08-19 VITALS — WEIGHT: 183.3 LBS | HEIGHT: 65 IN | BODY MASS INDEX: 30.54 KG/M2 | TEMPERATURE: 97.3 F

## 2024-08-19 DIAGNOSIS — D11.9 WARTHIN'S TUMOR: Primary | ICD-10-CM

## 2024-08-19 PROCEDURE — 3008F BODY MASS INDEX DOCD: CPT | Performed by: OTOLARYNGOLOGY

## 2024-08-19 PROCEDURE — 99024 POSTOP FOLLOW-UP VISIT: CPT | Performed by: OTOLARYNGOLOGY

## 2024-08-19 PROCEDURE — 4004F PT TOBACCO SCREEN RCVD TLK: CPT | Performed by: OTOLARYNGOLOGY

## 2024-08-19 ASSESSMENT — PATIENT HEALTH QUESTIONNAIRE - PHQ9
SUM OF ALL RESPONSES TO PHQ9 QUESTIONS 1 AND 2: 0
1. LITTLE INTEREST OR PLEASURE IN DOING THINGS: NOT AT ALL
2. FEELING DOWN, DEPRESSED OR HOPELESS: NOT AT ALL

## 2024-08-19 NOTE — PROGRESS NOTES
"ENT New Patient Visit    Chief complaint: left Warthin's tumor     History Of Present Illness  Idania Simon is a 48 y.o. female presents with left parotid mass present since 2012 but has progressively increased in size over the last 6 months. She was seen by Dr. Lynch who obtained a CT that demonstrated the 2.7cm left parotid mass, and subsequently biopsied, which showed Warthin's. She has a 30 pack year smoking history.     She has been experiencing intermittent pain and would like the mass removed.     8-19-24 post op: doing well, minimal pain. Has some numbness. Swelling has mostly subsided     Past Medical History  She has no past medical history on file.    Surgical History  She has a past surgical history that includes Tonsillectomy and Adenoidectomy.     Social History  She reports that she has been smoking cigarettes. She has never used smokeless tobacco. She reports that she does not currently use alcohol. She reports current drug use. Frequency: 7.00 times per week. Drug: Marijuana.    Family History  No family history on file.     Allergies  Patient has no known allergies.     Physical Exam:  NAD alert  Avril eomi NCAT  Left parotid incision healing well  No infection/ hematoma  FN intact    Last Recorded Vitals  Temperature 36.3 °C (97.3 °F), height 1.651 m (5' 5\"), weight 83.1 kg (183 lb 4.8 oz), last menstrual period 08/05/2024.    Relevant Results  CT Soft tissue neck 4/19/24:  IMPRESSION:  2.7 cm diameter heterogeneous enhancing mass predominantly located  within the superficial aspect of the left parotid gland with mild  extension to the deep lobe. Findings likely represent a primary  salivary gland neoplasm such as a Warthin's or benign mixed tumor.  Recommend FNA for further evaluation.      No enlarged or overtly suspicious cervical chain lymph nodes.      Subcentimeter right thyroid gland nodule.    Assessment and Plan  48 y.o. female with left parotid mass since 2012 that increased in size " over the last 6 months, biopsy showed Warthin's tumor. S/p extracapsular dissection    -healing well  -final path confirmed Warthin's  -RTC as needed    Romie Manuel MD

## 2024-10-16 DIAGNOSIS — K11.8 MASS OF LEFT PAROTID GLAND: ICD-10-CM

## 2024-10-16 RX ORDER — CEPHALEXIN 500 MG/1
500 CAPSULE ORAL 4 TIMES DAILY
Qty: 28 CAPSULE | Refills: 0 | Status: SHIPPED | OUTPATIENT
Start: 2024-10-16 | End: 2024-10-23

## 2025-04-18 ENCOUNTER — APPOINTMENT (OUTPATIENT)
Dept: OBSTETRICS AND GYNECOLOGY | Facility: CLINIC | Age: 50
End: 2025-04-18
Payer: COMMERCIAL

## 2025-04-18 VITALS
HEIGHT: 65 IN | SYSTOLIC BLOOD PRESSURE: 122 MMHG | DIASTOLIC BLOOD PRESSURE: 72 MMHG | BODY MASS INDEX: 28.99 KG/M2 | WEIGHT: 174 LBS

## 2025-04-18 DIAGNOSIS — Z12.31 ENCOUNTER FOR SCREENING MAMMOGRAM FOR MALIGNANT NEOPLASM OF BREAST: ICD-10-CM

## 2025-04-18 DIAGNOSIS — Z12.4 CERVICAL CANCER SCREENING: ICD-10-CM

## 2025-04-18 DIAGNOSIS — N95.1 PERIMENOPAUSE: Primary | ICD-10-CM

## 2025-04-18 DIAGNOSIS — Z01.419 WELL WOMAN EXAM: ICD-10-CM

## 2025-04-18 PROCEDURE — 87624 HPV HI-RISK TYP POOLED RSLT: CPT

## 2025-04-18 ASSESSMENT — PAIN SCALES - GENERAL: PAINLEVEL_OUTOF10: 0-NO PAIN

## 2025-04-18 NOTE — ASSESSMENT & PLAN NOTE
Discussed various changes of menopause, will refer to Dr. Covarrubias for discussion of care-number given

## 2025-04-18 NOTE — PROGRESS NOTES
"Assessment/Plan   Problem List Items Addressed This Visit           ICD-10-CM       Medium    Cervical cancer screening Z12.4    Relevant Orders    THINPREP PAP TEST    Encounter for screening mammogram for malignant neoplasm of breast Z12.31    Relevant Orders    BI mammo bilateral screening tomosynthesis    Perimenopause - Primary N95.1    Discussed various changes of menopause, will refer to Dr. Covarrubias for discussion of care-number given          Well woman exam Z01.419    Pap, mammogram             Grecia KAREEN Galicia, APRN-CNM     Subjective   Idania KAREEN Simon is a 49 y.o. female who is here for a routine exam.     New patient  No GYN care in years.     Reports mood has been poor, very angry, easily triggered.   Denies diagnosis of mood disorder.   Worse around cycle.     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 Still has occasional drink.     Colonoscopy: never  Pap due, mammogram due    Lives with: /daughter   Current employment: tech company   T/E/D: 1 PPD x 30 years/in recovery about 1 year from ETOH/daily marijuana   Up to date vision/dental: yes   PCP: yes   Menstrual history: irregular at times, doesn't really track. Reports sometimes skips but sometimes bleeds more frequent  Sexual history: monogamous male partner x 6 years   Pregnancy history:  G/P 3/2  T: 2 P:  EAB: 1  Ectopic:   SAB:   L:  2    Diet: not a great appetite   Exercise: walking     PMH, PSH, and Family hx reviewed and updated    Current contraception: vasectomy  Refill Y/N:    Last pap: 2013  History of abnormal Pap smear: no  Family history of breast, uterine,  ovarian cancer: no  Regular self breast exam: yes  Last mammogram: never had one             Review of Systems    Objective   /72   Ht 1.651 m (5' 5\")   Wt 78.9 kg (174 lb)   LMP 03/14/2025   BMI 28.96 kg/m²     Physical Exam  Constitutional:       Appearance: Normal appearance.   HENT:      Head: Normocephalic.   Neck:      Thyroid: No thyroid mass, " thyromegaly or thyroid tenderness.   Cardiovascular:      Rate and Rhythm: Normal rate and regular rhythm.   Pulmonary:      Effort: Pulmonary effort is normal.      Breath sounds: Normal breath sounds.   Chest:   Breasts:     Right: Normal. No mass, nipple discharge or tenderness.      Left: Normal. No mass, nipple discharge or tenderness.   Abdominal:      Palpations: Abdomen is soft.   Genitourinary:     Vagina: Normal.      Cervix: Normal.   Musculoskeletal:         General: Normal range of motion.   Lymphadenopathy:      Upper Body:      Right upper body: No axillary adenopathy.      Left upper body: No axillary adenopathy.   Skin:     General: Skin is warm and dry.   Neurological:      Mental Status: She is alert and oriented to person, place, and time.   Psychiatric:         Mood and Affect: Mood normal.

## 2025-04-22 ASSESSMENT — PROMIS GLOBAL HEALTH SCALE
RATE_QUALITY_OF_LIFE: VERY GOOD
RATE_SOCIAL_SATISFACTION: FAIR
RATE_PHYSICAL_HEALTH: GOOD
CARRYOUT_PHYSICAL_ACTIVITIES: COMPLETELY
RATE_AVERAGE_PAIN: 2
RATE_GENERAL_HEALTH: GOOD
CARRYOUT_SOCIAL_ACTIVITIES: VERY GOOD
EMOTIONAL_PROBLEMS: SOMETIMES
RATE_AVERAGE_FATIGUE: MILD
RATE_MENTAL_HEALTH: FAIR

## 2025-04-29 ENCOUNTER — APPOINTMENT (OUTPATIENT)
Dept: PRIMARY CARE | Facility: CLINIC | Age: 50
End: 2025-04-29
Payer: COMMERCIAL

## 2025-04-29 ENCOUNTER — APPOINTMENT (OUTPATIENT)
Dept: RADIOLOGY | Facility: CLINIC | Age: 50
End: 2025-04-29
Payer: COMMERCIAL

## 2025-04-29 VITALS — BODY MASS INDEX: 29.45 KG/M2 | SYSTOLIC BLOOD PRESSURE: 123 MMHG | WEIGHT: 177 LBS | DIASTOLIC BLOOD PRESSURE: 73 MMHG

## 2025-04-29 DIAGNOSIS — Z00.00 HEALTH CARE MAINTENANCE: Primary | ICD-10-CM

## 2025-04-29 DIAGNOSIS — Z72.0 TOBACCO USE: ICD-10-CM

## 2025-04-29 DIAGNOSIS — E78.5 BORDERLINE HYPERLIPIDEMIA: ICD-10-CM

## 2025-04-29 PROCEDURE — 99396 PREV VISIT EST AGE 40-64: CPT | Performed by: INTERNAL MEDICINE

## 2025-04-29 NOTE — PROGRESS NOTES
Subjective   Patient ID: Idania Simon is a 49 y.o. female who presents for No chief complaint on file..    HPI follow-up visit CPE no chest pain no shortness of breath no side effect with medication thinks she had lost about 20 pounds altogether although weight seems to be about the same she did have her head and neck surgery and has been okay since that time but sometimes does not eat except when she is hungry tends to drink some water still smoking but did stop drinking bowels normal no dysuria bones muscles joints okay    Past medical history parotid warthin    Medications none at the current time    Allergies no known drug allergies    Social history tobacco 1 pack/day alcohol social less than before    Family history daughter with depression    Prevention hikes for exercise walks a dog recent blood work reviewed has not had follow-up with GYN discussed with colonoscopy    Review of Systems    Objective   LMP 03/14/2025     Physical Exam vital signs noted alert and oriented x 3 NCAT PERRLA EOMI nares without discharge OP benign TM normal bilateral EAC clear bilateral no AC nodes no lid lag no thyromegaly no JVD or bruit chest clear to auscultation and percussion CV regular rate and rhythm S1-S2 without murmur gallop or rub abdomen soft nontender normal active bowel sounds LS spine normal curvature negative straight leg raise extremities no clubbing cyanosis or edema normal distal pulses DTR 2+ and brisk skin and musculoskeletal decreased size over the left parotid area    Assessment/Plan    impression General Medical examination weight loss?  Tobacco other diagnoses borderline hyperlipidemia  Plan check comprehensive panel advised on glucose potassium and kidney function check CBC advised on blood count check TSH advised on thyroid check lipid panel advised on cholesterol profile stop smoking altogether good diet regular exercise good water consumption follow-up GYN and mammogram eventual follow-up on  colonoscopy and recheck 1 to 3 months based on above TT 50 cc 26    Review chart for carotid put in the blood work as above (check)

## 2025-05-01 ENCOUNTER — APPOINTMENT (OUTPATIENT)
Dept: RADIOLOGY | Facility: CLINIC | Age: 50
End: 2025-05-01
Payer: COMMERCIAL

## 2025-05-01 VITALS — WEIGHT: 177 LBS | BODY MASS INDEX: 29.49 KG/M2 | HEIGHT: 65 IN

## 2025-05-01 DIAGNOSIS — Z12.31 ENCOUNTER FOR SCREENING MAMMOGRAM FOR MALIGNANT NEOPLASM OF BREAST: ICD-10-CM

## 2025-05-01 PROCEDURE — 77067 SCR MAMMO BI INCL CAD: CPT

## 2025-05-01 PROCEDURE — 77063 BREAST TOMOSYNTHESIS BI: CPT | Performed by: RADIOLOGY

## 2025-05-01 PROCEDURE — 77067 SCR MAMMO BI INCL CAD: CPT | Performed by: RADIOLOGY

## 2025-05-02 ENCOUNTER — TELEPHONE (OUTPATIENT)
Dept: OBSTETRICS AND GYNECOLOGY | Facility: CLINIC | Age: 50
End: 2025-05-02
Payer: COMMERCIAL

## 2025-05-02 DIAGNOSIS — R92.8 ABNORMAL MAMMOGRAM: ICD-10-CM

## 2025-05-02 NOTE — TELEPHONE ENCOUNTER
Pt contacted.    Discussed recent mmg result and need for further imaging of leftbreast per radiologist.  Orders for diagnostic left mmg and left breast US placed in system per order CNM.

## 2025-05-06 ENCOUNTER — HOSPITAL ENCOUNTER (OUTPATIENT)
Dept: RADIOLOGY | Facility: CLINIC | Age: 50
Discharge: HOME | End: 2025-05-06
Payer: COMMERCIAL

## 2025-05-06 DIAGNOSIS — R92.8 ABNORMAL MAMMOGRAM: ICD-10-CM

## 2025-05-06 LAB
ALBUMIN SERPL-MCNC: 4.3 G/DL (ref 3.6–5.1)
ALP SERPL-CCNC: 49 U/L (ref 31–125)
ALT SERPL-CCNC: 9 U/L (ref 6–29)
ANION GAP SERPL CALCULATED.4IONS-SCNC: 8 MMOL/L (CALC) (ref 7–17)
AST SERPL-CCNC: 11 U/L (ref 10–35)
BILIRUB SERPL-MCNC: 0.4 MG/DL (ref 0.2–1.2)
BUN SERPL-MCNC: 13 MG/DL (ref 7–25)
CALCIUM SERPL-MCNC: 9 MG/DL (ref 8.6–10.2)
CHLORIDE SERPL-SCNC: 106 MMOL/L (ref 98–110)
CHOLEST SERPL-MCNC: 176 MG/DL
CHOLEST/HDLC SERPL: 3.3 (CALC)
CO2 SERPL-SCNC: 25 MMOL/L (ref 20–32)
CREAT SERPL-MCNC: 0.73 MG/DL (ref 0.5–0.99)
EGFRCR SERPLBLD CKD-EPI 2021: 102 ML/MIN/1.73M2
ERYTHROCYTE [DISTWIDTH] IN BLOOD BY AUTOMATED COUNT: 13.4 % (ref 11–15)
GLUCOSE SERPL-MCNC: 92 MG/DL (ref 65–99)
HCT VFR BLD AUTO: 40.5 % (ref 35–45)
HDLC SERPL-MCNC: 53 MG/DL
HGB BLD-MCNC: 13.2 G/DL (ref 11.7–15.5)
LDLC SERPL CALC-MCNC: 108 MG/DL (CALC)
MCH RBC QN AUTO: 31.9 PG (ref 27–33)
MCHC RBC AUTO-ENTMCNC: 32.6 G/DL (ref 32–36)
MCV RBC AUTO: 97.8 FL (ref 80–100)
NONHDLC SERPL-MCNC: 123 MG/DL (CALC)
PLATELET # BLD AUTO: 317 THOUSAND/UL (ref 140–400)
PMV BLD REES-ECKER: 9.4 FL (ref 7.5–12.5)
POTASSIUM SERPL-SCNC: 4.6 MMOL/L (ref 3.5–5.3)
PROT SERPL-MCNC: 6.7 G/DL (ref 6.1–8.1)
RBC # BLD AUTO: 4.14 MILLION/UL (ref 3.8–5.1)
SODIUM SERPL-SCNC: 139 MMOL/L (ref 135–146)
TRIGL SERPL-MCNC: 68 MG/DL
TSH SERPL-ACNC: 0.9 MIU/L
WBC # BLD AUTO: 7.1 THOUSAND/UL (ref 3.8–10.8)

## 2025-05-06 PROCEDURE — 77065 DX MAMMO INCL CAD UNI: CPT | Mod: LT

## 2025-06-20 ENCOUNTER — APPOINTMENT (OUTPATIENT)
Facility: CLINIC | Age: 50
End: 2025-06-20
Payer: COMMERCIAL

## 2025-08-15 ENCOUNTER — APPOINTMENT (OUTPATIENT)
Facility: CLINIC | Age: 50
End: 2025-08-15
Payer: COMMERCIAL

## 2025-08-15 VITALS
HEIGHT: 65 IN | BODY MASS INDEX: 29.06 KG/M2 | SYSTOLIC BLOOD PRESSURE: 132 MMHG | WEIGHT: 174.4 LBS | DIASTOLIC BLOOD PRESSURE: 88 MMHG

## 2025-08-15 DIAGNOSIS — L65.9 HAIR THINNING: ICD-10-CM

## 2025-08-15 DIAGNOSIS — R45.86 MOOD SWINGS: ICD-10-CM

## 2025-08-15 DIAGNOSIS — R41.3 MEMORY CHANGES: ICD-10-CM

## 2025-08-15 DIAGNOSIS — N93.8 DUB (DYSFUNCTIONAL UTERINE BLEEDING): ICD-10-CM

## 2025-08-15 DIAGNOSIS — L85.3 DRY SKIN: ICD-10-CM

## 2025-08-15 DIAGNOSIS — G47.09 TROUBLE GETTING TO SLEEP: ICD-10-CM

## 2025-08-15 DIAGNOSIS — N39.41 URGE INCONTINENCE: ICD-10-CM

## 2025-08-15 DIAGNOSIS — F41.9 ANXIETY: ICD-10-CM

## 2025-08-15 DIAGNOSIS — G47.9 TROUBLE IN SLEEPING: ICD-10-CM

## 2025-08-15 DIAGNOSIS — Z71.6 TOBACCO ABUSE COUNSELING: ICD-10-CM

## 2025-08-15 DIAGNOSIS — R61 NIGHT SWEATS: ICD-10-CM

## 2025-08-15 DIAGNOSIS — R45.4 IRRITABILITY: ICD-10-CM

## 2025-08-15 DIAGNOSIS — N93.9 ABNORMAL UTERINE BLEEDING (AUB): Primary | ICD-10-CM

## 2025-08-15 DIAGNOSIS — R53.83 LACK OF ENERGY: ICD-10-CM

## 2025-08-15 LAB — PREGNANCY TEST URINE, POC: NEGATIVE

## 2025-08-15 RX ORDER — MEDROXYPROGESTERONE ACETATE 150 MG/ML
150 INJECTION, SUSPENSION INTRAMUSCULAR ONCE
Status: COMPLETED | OUTPATIENT
Start: 2025-08-15 | End: 2025-08-15

## 2025-08-15 RX ADMIN — MEDROXYPROGESTERONE ACETATE 150 MG: 150 INJECTION, SUSPENSION INTRAMUSCULAR at 12:43

## 2025-08-15 ASSESSMENT — COLUMBIA-SUICIDE SEVERITY RATING SCALE - C-SSRS
2. HAVE YOU ACTUALLY HAD ANY THOUGHTS OF KILLING YOURSELF?: NO
1. IN THE PAST MONTH, HAVE YOU WISHED YOU WERE DEAD OR WISHED YOU COULD GO TO SLEEP AND NOT WAKE UP?: NO
6. HAVE YOU EVER DONE ANYTHING, STARTED TO DO ANYTHING, OR PREPARED TO DO ANYTHING TO END YOUR LIFE?: NO

## 2025-08-15 ASSESSMENT — PATIENT HEALTH QUESTIONNAIRE - PHQ9
1. LITTLE INTEREST OR PLEASURE IN DOING THINGS: NOT AT ALL
2. FEELING DOWN, DEPRESSED OR HOPELESS: NOT AT ALL
SUM OF ALL RESPONSES TO PHQ9 QUESTIONS 1 AND 2: 0

## 2025-08-15 ASSESSMENT — ENCOUNTER SYMPTOMS
DEPRESSION: 0
OCCASIONAL FEELINGS OF UNSTEADINESS: 0
LOSS OF SENSATION IN FEET: 0

## 2025-08-29 ENCOUNTER — APPOINTMENT (OUTPATIENT)
Facility: CLINIC | Age: 50
End: 2025-08-29
Payer: COMMERCIAL

## (undated) DEVICE — SHEARS, CURVED 9CM HARMONIC FOCUS

## (undated) DEVICE — SPONGE, DISSECTOR, PEANUT, 3/8, STERILE 5 FOAM HOLDER"

## (undated) DEVICE — NEEDLE, ECLIPSE, 25GA X 1-1/2 IN

## (undated) DEVICE — DRAPE, INSTRUMENT, W/POUCH, STERI DRAPE, 7 X 11 IN, DISPOSABLE, STERILE

## (undated) DEVICE — OINTMENT, TOPICAL, BACITRACIN

## (undated) DEVICE — ELECTRODE, ELECTROSURGICAL, BLADE, INSULATED, ENT/IMA, STERILE

## (undated) DEVICE — APPLIER, LIGACLIP, MULTIPLE, SMALL 9-3/8IN

## (undated) DEVICE — STAPLER, SKIN PROXIMATE, 35 WIDE

## (undated) DEVICE — MARKER, SKIN, REGULAR TIP, W/FLEXI-RULER

## (undated) DEVICE — STRIP, SKIN CLOSURE, STERI STRIP, REINFORCED, 0.5 X 4 IN

## (undated) DEVICE — SYRINGE, 10 CC, LUER LOCK

## (undated) DEVICE — STAY SET, SURGICAL, 12MM BLUNT HOOK, 8/PK

## (undated) DEVICE — FORCEP, BIOPOLAR, ADSON, NON INSUL, 5IN 1.0MM

## (undated) DEVICE — SYRINGE, 60 CC, IRRIGATION, BULB, CONTRO-BULB, PAPER POUCH

## (undated) DEVICE — HEMOSTAT, ABSORABABLE, SURGICEL SNOW, 2 X 4, LF

## (undated) DEVICE — Device

## (undated) DEVICE — DRESSING, DRAIN SPONGE, EXCILON AMD, 4X4 IN, 6 PLY, ST

## (undated) DEVICE — DRESSING, TELFA, 3X4

## (undated) DEVICE — ELECTRODE, ELECTROSURGICAL, GUARDED TIP

## (undated) DEVICE — SLEEVE, VASO PRESS, CALF GARMENT, MEDIUM, GREEN

## (undated) DEVICE — HANDLE, PH, FOR YANKAUER SUCTION DEVICE

## (undated) DEVICE — CORD, CAUTERY, BIOPOLAR FORCEP, 12FT